# Patient Record
Sex: FEMALE | Race: WHITE | NOT HISPANIC OR LATINO | Employment: FULL TIME | URBAN - METROPOLITAN AREA
[De-identification: names, ages, dates, MRNs, and addresses within clinical notes are randomized per-mention and may not be internally consistent; named-entity substitution may affect disease eponyms.]

---

## 2022-06-02 ENCOUNTER — TRANSFERRED RECORDS (OUTPATIENT)
Dept: MULTI SPECIALTY CLINIC | Facility: CLINIC | Age: 32
End: 2022-06-02

## 2022-06-02 LAB
HPV ABSTRACT: NORMAL
PAP-ABSTRACT: NORMAL

## 2023-10-22 ENCOUNTER — HEALTH MAINTENANCE LETTER (OUTPATIENT)
Age: 33
End: 2023-10-22

## 2023-10-26 ENCOUNTER — OFFICE VISIT (OUTPATIENT)
Dept: FAMILY MEDICINE | Facility: CLINIC | Age: 33
End: 2023-10-26
Payer: COMMERCIAL

## 2023-10-26 VITALS
HEIGHT: 66 IN | BODY MASS INDEX: 27.98 KG/M2 | SYSTOLIC BLOOD PRESSURE: 97 MMHG | RESPIRATION RATE: 16 BRPM | OXYGEN SATURATION: 97 % | HEART RATE: 60 BPM | WEIGHT: 174.13 LBS | DIASTOLIC BLOOD PRESSURE: 64 MMHG | TEMPERATURE: 98.5 F

## 2023-10-26 DIAGNOSIS — F41.1 GAD (GENERALIZED ANXIETY DISORDER): ICD-10-CM

## 2023-10-26 DIAGNOSIS — K58.9 IRRITABLE BOWEL SYNDROME, UNSPECIFIED TYPE: ICD-10-CM

## 2023-10-26 DIAGNOSIS — Z11.4 SCREENING FOR HIV (HUMAN IMMUNODEFICIENCY VIRUS): ICD-10-CM

## 2023-10-26 DIAGNOSIS — Z00.00 ROUTINE GENERAL MEDICAL EXAMINATION AT A HEALTH CARE FACILITY: Primary | ICD-10-CM

## 2023-10-26 DIAGNOSIS — Z13.21 ENCOUNTER FOR VITAMIN DEFICIENCY SCREENING: ICD-10-CM

## 2023-10-26 DIAGNOSIS — F33.1 MODERATE EPISODE OF RECURRENT MAJOR DEPRESSIVE DISORDER (H): ICD-10-CM

## 2023-10-26 DIAGNOSIS — R23.3 EASY BRUISING: ICD-10-CM

## 2023-10-26 DIAGNOSIS — R79.89 ABNORMAL TSH: ICD-10-CM

## 2023-10-26 DIAGNOSIS — Z13.228 SCREENING FOR METABOLIC DISORDER: ICD-10-CM

## 2023-10-26 DIAGNOSIS — Z11.59 NEED FOR HEPATITIS C SCREENING TEST: ICD-10-CM

## 2023-10-26 DIAGNOSIS — Z13.220 SCREENING FOR LIPID DISORDERS: ICD-10-CM

## 2023-10-26 PROBLEM — J30.2 SEASONAL ALLERGIES: Status: ACTIVE | Noted: 2023-10-26

## 2023-10-26 PROBLEM — F32.9 MAJOR DEPRESSION: Status: ACTIVE | Noted: 2023-10-26

## 2023-10-26 LAB
ERYTHROCYTE [DISTWIDTH] IN BLOOD BY AUTOMATED COUNT: 12.1 % (ref 10–15)
HCT VFR BLD AUTO: 40.4 % (ref 35–47)
HGB BLD-MCNC: 13.7 G/DL (ref 11.7–15.7)
HOLD SPECIMEN: NORMAL
MCH RBC QN AUTO: 31.5 PG (ref 26.5–33)
MCHC RBC AUTO-ENTMCNC: 33.9 G/DL (ref 31.5–36.5)
MCV RBC AUTO: 93 FL (ref 78–100)
PLATELET # BLD AUTO: 210 10E3/UL (ref 150–450)
RBC # BLD AUTO: 4.35 10E6/UL (ref 3.8–5.2)
WBC # BLD AUTO: 5.4 10E3/UL (ref 4–11)

## 2023-10-26 PROCEDURE — 99214 OFFICE O/P EST MOD 30 MIN: CPT | Mod: 25

## 2023-10-26 PROCEDURE — 36415 COLL VENOUS BLD VENIPUNCTURE: CPT

## 2023-10-26 PROCEDURE — 82306 VITAMIN D 25 HYDROXY: CPT

## 2023-10-26 PROCEDURE — 85027 COMPLETE CBC AUTOMATED: CPT

## 2023-10-26 PROCEDURE — 84443 ASSAY THYROID STIM HORMONE: CPT

## 2023-10-26 PROCEDURE — 86803 HEPATITIS C AB TEST: CPT

## 2023-10-26 PROCEDURE — 99385 PREV VISIT NEW AGE 18-39: CPT

## 2023-10-26 PROCEDURE — 80061 LIPID PANEL: CPT

## 2023-10-26 PROCEDURE — 80053 COMPREHEN METABOLIC PANEL: CPT

## 2023-10-26 PROCEDURE — 87389 HIV-1 AG W/HIV-1&-2 AB AG IA: CPT

## 2023-10-26 RX ORDER — BUSPIRONE HYDROCHLORIDE 10 MG/1
10 TABLET ORAL 2 TIMES DAILY
Qty: 180 TABLET | Refills: 1 | Status: SHIPPED | OUTPATIENT
Start: 2023-10-26 | End: 2024-01-10 | Stop reason: SINTOL

## 2023-10-26 RX ORDER — BUSPIRONE HYDROCHLORIDE 10 MG/1
10 TABLET ORAL 2 TIMES DAILY
COMMUNITY
Start: 2023-05-11 | End: 2023-10-26

## 2023-10-26 RX ORDER — BUPROPION HYDROCHLORIDE 150 MG/1
150 TABLET, EXTENDED RELEASE ORAL 2 TIMES DAILY
Qty: 180 TABLET | Refills: 1 | Status: SHIPPED | OUTPATIENT
Start: 2023-10-26 | End: 2024-07-29

## 2023-10-26 ASSESSMENT — ENCOUNTER SYMPTOMS
JOINT SWELLING: 0
HEARTBURN: 0
CHILLS: 0
HEMATOCHEZIA: 0
DYSURIA: 0
HEMATURIA: 0
SHORTNESS OF BREATH: 1
NERVOUS/ANXIOUS: 1
WEAKNESS: 0
CONSTIPATION: 0
COUGH: 0
HEADACHES: 0
ARTHRALGIAS: 0
NAUSEA: 0
FREQUENCY: 0
PALPITATIONS: 0
MYALGIAS: 0
DIZZINESS: 0
SORE THROAT: 0
FEVER: 0
EYE PAIN: 0
ABDOMINAL PAIN: 0
DIARRHEA: 0
PARESTHESIAS: 0

## 2023-10-26 ASSESSMENT — ANXIETY QUESTIONNAIRES
GAD7 TOTAL SCORE: 19
1. FEELING NERVOUS, ANXIOUS, OR ON EDGE: NEARLY EVERY DAY
3. WORRYING TOO MUCH ABOUT DIFFERENT THINGS: NEARLY EVERY DAY
IF YOU CHECKED OFF ANY PROBLEMS ON THIS QUESTIONNAIRE, HOW DIFFICULT HAVE THESE PROBLEMS MADE IT FOR YOU TO DO YOUR WORK, TAKE CARE OF THINGS AT HOME, OR GET ALONG WITH OTHER PEOPLE: VERY DIFFICULT
4. TROUBLE RELAXING: MORE THAN HALF THE DAYS
GAD7 TOTAL SCORE: 19
2. NOT BEING ABLE TO STOP OR CONTROL WORRYING: NEARLY EVERY DAY
7. FEELING AFRAID AS IF SOMETHING AWFUL MIGHT HAPPEN: NEARLY EVERY DAY
6. BECOMING EASILY ANNOYED OR IRRITABLE: NEARLY EVERY DAY
5. BEING SO RESTLESS THAT IT IS HARD TO SIT STILL: MORE THAN HALF THE DAYS

## 2023-10-26 ASSESSMENT — PATIENT HEALTH QUESTIONNAIRE - PHQ9
SUM OF ALL RESPONSES TO PHQ QUESTIONS 1-9: 15
10. IF YOU CHECKED OFF ANY PROBLEMS, HOW DIFFICULT HAVE THESE PROBLEMS MADE IT FOR YOU TO DO YOUR WORK, TAKE CARE OF THINGS AT HOME, OR GET ALONG WITH OTHER PEOPLE: VERY DIFFICULT
SUM OF ALL RESPONSES TO PHQ QUESTIONS 1-9: 15

## 2023-10-26 NOTE — ASSESSMENT & PLAN NOTE
Last TSH was within normal range last year but will update labs today. No current medications or treatment.

## 2023-10-26 NOTE — PROGRESS NOTES
"   SUBJECTIVE:   CC: Reva is an 33 year old who presents for preventive health visit.       10/26/2023    12:17 PM   Additional Questions   Roomed by sac   Accompanied by self         10/26/2023    12:17 PM   Patient Reported Additional Medications   Patient reports taking the following new medications no     Generalized Anxiety:  -Long history of mental health concerns  -She has tried a number of medications in the past without success. She notes that in early stages of her treatment, she was not super consistent with taking them.  -\"Feels like I am in physical pain from it.\"  -Has been engaging in some self care techniques that she got from psychology.  -Most recent treatment with buspirone.  Not sure if this was entirely successful. Had some mild side effects that were there, but tolerable  -She reports that she was diagnosed as bipolar as a young child (13-dolores) but has not been evaluated by psychiatry as an adult  -Also wonders about OCD. Has some intrusive thoughts/tendencies    Healthy Habits:     Getting at least 3 servings of Calcium per day:  NO    Bi-annual eye exam:  NO    Dental care twice a year:  Yes    Sleep apnea or symptoms of sleep apnea:  None    Diet:  Regular (no restrictions)    Frequency of exercise:  None    Taking medications regularly:  Yes    Medication side effects:  Not applicable    Additional concerns today:  Yes    Today's PHQ-9 Score:       10/26/2023    12:02 PM   PHQ-9 SCORE   PHQ-9 Total Score MyChart 15 (Moderately severe depression)   PHQ-9 Total Score 15     Social History     Tobacco Use    Smoking status: Former     Types: Cigarettes     Passive exposure: Past    Smokeless tobacco: Never   Substance Use Topics    Alcohol use: Not on file             10/26/2023    12:01 PM   Alcohol Use   Prescreen: >3 drinks/day or >7 drinks/week? Not Applicable     Reviewed orders with patient.  Reviewed health maintenance and updated orders accordingly - Yes  Lab work is in " process  Labs reviewed in EPIC  BP Readings from Last 3 Encounters:   10/26/23 97/64    Wt Readings from Last 3 Encounters:   10/26/23 79 kg (174 lb 2 oz)                  Patient Active Problem List   Diagnosis    Abnormal TSH    PÉREZ (generalized anxiety disorder)    Moderate episode of recurrent major depressive disorder (H)    Seasonal allergies    Irritable bowel syndrome, unspecified type    Routine general medical examination at a health care facility     History reviewed. No pertinent surgical history.    Social History     Tobacco Use    Smoking status: Former     Types: Cigarettes     Passive exposure: Past    Smokeless tobacco: Never   Substance Use Topics    Alcohol use: Not on file     Family History   Problem Relation Age of Onset    Bipolar Disorder Mother     Depression Father     Breast Cancer No family hx of     Colon Cancer No family hx of     Diabetes No family hx of     Thyroid Disease No family hx of     Coronary Artery Disease Early Onset No family hx of          Current Outpatient Medications   Medication Sig Dispense Refill    buPROPion (WELLBUTRIN SR) 150 MG 12 hr tablet Take 1 tablet (150 mg) by mouth 2 times daily 180 tablet 1    busPIRone (BUSPAR) 10 MG tablet Take 1 tablet (10 mg) by mouth 2 times daily Was only taking once daily 180 tablet 1    cannabidiol (EPIDIOLEX) 100 MG/ML oral solution Take 250 mg by mouth 2 times daily CBD GUMMIES for last 2 weeks due to unable to get Buspirone       No Known Allergies    Breast Cancer Screening:        10/26/2023    12:03 PM   Breast CA Risk Assessment (FHS-7)   Do you have a family history of breast, colon, or ovarian cancer? No / Unknown     Patient under 40 years of age: Routine Mammogram Screening not recommended.   Pertinent mammograms are reviewed under the imaging tab.    History of abnormal Pap smear: NO - age 30-65 PAP every 5 years with negative HPV co-testing recommended     Reviewed and updated as needed this visit by clinical  "staff   Tobacco  Allergies  Meds  Problems  Med Hx  Surg Hx  Fam Hx          Reviewed and updated as needed this visit by Provider   Tobacco  Allergies  Meds  Problems  Med Hx  Surg Hx  Fam Hx         Review of Systems   Constitutional:  Negative for chills and fever.   HENT:  Negative for congestion, ear pain, hearing loss and sore throat.    Eyes:  Negative for pain and visual disturbance.   Respiratory:  Positive for shortness of breath. Negative for cough.    Cardiovascular:  Negative for chest pain, palpitations and peripheral edema.   Gastrointestinal:  Negative for abdominal pain, constipation, diarrhea, heartburn, hematochezia and nausea.   Genitourinary:  Negative for dysuria, frequency, genital sores, hematuria and urgency.   Musculoskeletal:  Negative for arthralgias, joint swelling and myalgias.   Skin:  Negative for rash.   Neurological:  Negative for dizziness, weakness, headaches and paresthesias.   Psychiatric/Behavioral:  Negative for mood changes. The patient is nervous/anxious.       OBJECTIVE:   BP 97/64 (BP Location: Left arm, Patient Position: Sitting, Cuff Size: Adult Large)   Pulse 60   Temp 98.5  F (36.9  C) (Oral)   Resp 16   Ht 1.664 m (5' 5.5\")   Wt 79 kg (174 lb 2 oz)   LMP 10/11/2023 (Approximate)   SpO2 97%   BMI 28.54 kg/m    Physical Exam  GENERAL: healthy, alert and no distress  EYES: Eyes grossly normal to inspection, PERRL and conjunctivae and sclerae normal  HENT: ear canals and TM's normal, nose and mouth without ulcers or lesions  NECK: no adenopathy, no asymmetry, masses, or scars and thyroid normal to palpation  RESP: lungs clear to auscultation - no rales, rhonchi or wheezes  BREAST: declined by patient. Discussed breast awareness.  CV: regular rate and rhythm, normal S1 S2, no S3 or S4, no murmur, click or rub, no peripheral edema and peripheral pulses strong  ABDOMEN: soft, nontender, no hepatosplenomegaly, no masses and bowel sounds normal  MS: no " gross musculoskeletal defects noted, no edema  SKIN: no suspicious lesions or rashes  NEURO: Normal strength and tone, mentation intact and speech normal  PSYCH: mentation appears normal. Appears anxious. Minimal eye contact.    ASSESSMENT/PLAN:     Problem List Items Addressed This Visit       Abnormal TSH     Last TSH was within normal range last year but will update labs today. No current medications or treatment.         Relevant Orders    TSH with free T4 reflex    PÉREZ (generalized anxiety disorder)    Relevant Medications    cannabidiol (EPIDIOLEX) 100 MG/ML oral solution    buPROPion (WELLBUTRIN SR) 150 MG 12 hr tablet    busPIRone (BUSPAR) 10 MG tablet    Other Relevant Orders    Adult Mental Health  Referral    Adult Mental Health  Referral    Moderate episode of recurrent major depressive disorder (H)     Long standing history of mental health concerns. Both per patient and via chart review, it is noted that she has failed many traditional medications. Additionally, patient reports a history of bipolar diagnosed as an adolescent, so agree that avoiding selective serotonin reuptake inhibitor/SNRI therapy seems most appropriate. Concurrent with anxiety; patient also notes some OCD tendencies. Based on her history, I do think she would be best served by consultation with psychiatry for medication stabilization and likely diagnosis clarification. Will plan to resume Buspar, as it was 'better than nothing' per patient. Will also add on wellbutrin, as I can't see she has been on this previously and does endorse significant depressive feelings. She denies any thoughts of self harm and/or suicide today and reports this will 'never be an option' for her as she has an 11 year old daughter. Discussed safety plan and potential side effects of these medications. Will plan for close follow up, ideally with psychiatry, but with myself in 2-3 months otherwise. We briefly discussed other supportive cares  in the form of talk therapy, diet and exercise. Can address more comprehensively in the future.         Relevant Medications    buPROPion (WELLBUTRIN SR) 150 MG 12 hr tablet    busPIRone (BUSPAR) 10 MG tablet    Other Relevant Orders    Adult Mental Health  Referral    Vitamin D Deficiency    Irritable bowel syndrome, unspecified type     Heightened symptoms with mental health struggles and poor diet. Addressing mental health as documented elsewhere. Provided handout related to dietary alterations. Will refer to gastroenterology if symptoms persist despite this.         Relevant Orders    Adult GI  Referral - Consult Only    Routine general medical examination at a health care facility - Primary     Annual exam. New patient.  Patient is at average risk of breast and colon cancer, therefore screenings have not been initiated.  PAP: UTD  Immunizations: Received COVID and influenza through work. Otherwise, UTD.  Labs: Discussed and offered today. Patient would like wide ranging workup.  Mood: As documented elsewhere.  BMI: 28.54. Discussed diet and exercise  Discussed bone health. Checking Vitamin D.  Contraceptive form: None. I highly encouraged patient to stabilize mental health prior to achieving pregnancy.  Follow up in one year for annual exam or sooner if needed/indicated.          Other Visit Diagnoses       Screening for HIV (human immunodeficiency virus)        Relevant Orders    HIV Antigen Antibody Combo    Need for hepatitis C screening test        Relevant Orders    Hepatitis C Screen Reflex to HCV RNA Quant and Genotype    Easy bruising        Relevant Orders    CBC with Platelets and Reflex to Iron Studies    Screening for metabolic disorder        Relevant Orders    Comprehensive metabolic panel (BMP + Alb, Alk Phos, ALT, AST, Total. Bili, TP)    Screening for lipid disorders        Relevant Orders    Lipid panel reflex to direct LDL Fasting    Encounter for vitamin deficiency  "screening        Relevant Orders    Vitamin D Deficiency          Depression Screening Follow Up        10/26/2023    12:02 PM   PHQ   PHQ-9 Total Score 15   Q9: Thoughts of better off dead/self-harm past 2 weeks Not at all         10/26/2023    12:02 PM   Last PHQ-9   1.  Little interest or pleasure in doing things 3   2.  Feeling down, depressed, or hopeless 3   3.  Trouble falling or staying asleep, or sleeping too much 2   4.  Feeling tired or having little energy 1   5.  Poor appetite or overeating 1   6.  Feeling bad about yourself 3   7.  Trouble concentrating 2   8.  Moving slowly or restless 0   Q9: Thoughts of better off dead/self-harm past 2 weeks 0   PHQ-9 Total Score 15       Follow Up Actions Taken  Crisis resource information provided in After Visit Summary  Depression Action Plan reviewed with patient.  Mental Health Referral placed  Started patient on anti-depressant.  Adjusted patient's anti-depressant medication.       COUNSELING:  Reviewed preventive health counseling, as reflected in patient instructions       Regular exercise       Healthy diet/nutrition       Contraception       Family planning       Osteoporosis prevention/bone health       Safe sex practices/STD prevention      BMI:   Estimated body mass index is 28.54 kg/m  as calculated from the following:    Height as of this encounter: 1.664 m (5' 5.5\").    Weight as of this encounter: 79 kg (174 lb 2 oz).     Weight management plan: Discussed healthy diet and exercise guidelines    She reports that she has quit smoking. Her smoking use included cigarettes. She has been exposed to tobacco smoke. She has never used smokeless tobacco.          WILFRED Mancera CNP  [facility]    Answers submitted by the patient for this visit:  Patient Health Questionnaire (Submitted on 10/26/2023)  If you checked off any problems, how difficult have these problems made it for you to do your work, take care of things at home, " or get along with other people?: Very difficult  PHQ9 TOTAL SCORE: 15

## 2023-10-26 NOTE — ASSESSMENT & PLAN NOTE
Annual exam. New patient.  Patient is at average risk of breast and colon cancer, therefore screenings have not been initiated.  PAP: UTD  Immunizations: Received COVID and influenza through work. Otherwise, UTD.  Labs: Discussed and offered today. Patient would like wide ranging workup.  Mood: As documented elsewhere.  BMI: 28.54. Discussed diet and exercise  Discussed bone health. Checking Vitamin D.  Contraceptive form: None. I highly encouraged patient to stabilize mental health prior to achieving pregnancy.  Follow up in one year for annual exam or sooner if needed/indicated.

## 2023-10-26 NOTE — ASSESSMENT & PLAN NOTE
Long standing history of mental health concerns. Both per patient and via chart review, it is noted that she has failed many traditional medications. Additionally, patient reports a history of bipolar diagnosed as an adolescent, so agree that avoiding selective serotonin reuptake inhibitor/SNRI therapy seems most appropriate. Concurrent with anxiety; patient also notes some OCD tendencies. Based on her history, I do think she would be best served by consultation with psychiatry for medication stabilization and likely diagnosis clarification. Will plan to resume Buspar, as it was 'better than nothing' per patient. Will also add on wellbutrin, as I can't see she has been on this previously and does endorse significant depressive feelings. She denies any thoughts of self harm and/or suicide today and reports this will 'never be an option' for her as she has an 11 year old daughter. Discussed safety plan and potential side effects of these medications. Will plan for close follow up, ideally with psychiatry, but with myself in 2-3 months otherwise. We briefly discussed other supportive cares in the form of talk therapy, diet and exercise. Can address more comprehensively in the future.

## 2023-10-26 NOTE — PATIENT INSTRUCTIONS
For Mental Health:  -Restart your buspar. Start with once a day; if tolerating well, increase to twice a day.  -Do this for at least 2 weeks  -After two weeks, start with one Wellbutrin the morning (150mg). After a minimum of three days, you can add on a second wellbutrin at night (also 150mg).  -I have placed referrals for both psychiatry and counseling    For irritable bowel:  -We will work on mental health as we talked about  -Look at the diet handout  -I did place an order for a gastroenterologist specialist as well    Preventive Health Recommendations  Female Ages 26 - 39  Yearly exam:   See your health care provider every year in order to  Review health changes.   Discuss preventive care.    Review your medicines if you your doctor has prescribed any.    Until age 30: Get a Pap test every three years (more often if you have had an abnormal result).    After age 30: Talk to your doctor about whether you should have a Pap test every 3 years or have a Pap test with HPV screening every 5 years.   You do not need a Pap test if your uterus was removed (hysterectomy) and you have not had cancer.  You should be tested each year for STDs (sexually transmitted diseases), if you're at risk.   Talk to your provider about how often to have your cholesterol checked.  If you are at risk for diabetes, you should have a diabetes test (fasting glucose).  Shots: Get a flu shot each year. Get a tetanus shot every 10 years.   Nutrition:   Eat at least 5 servings of fruits and vegetables each day.  Eat whole-grain bread, whole-wheat pasta and brown rice instead of white grains and rice.  Get adequate Calcium and Vitamin D.     Lifestyle  Exercise at least 150 minutes a week (30 minutes a day, 5 days of the week). This will help you control your weight and prevent disease.  Limit alcohol to one drink per day.  No smoking.   Wear sunscreen to prevent skin cancer.  See your dentist every six months for an exam and cleaning.

## 2023-10-26 NOTE — ASSESSMENT & PLAN NOTE
Heightened symptoms with mental health struggles and poor diet. Addressing mental health as documented elsewhere. Provided handout related to dietary alterations. Will refer to gastroenterology if symptoms persist despite this.

## 2023-10-27 LAB
ALBUMIN SERPL BCG-MCNC: 4.4 G/DL (ref 3.5–5.2)
ALP SERPL-CCNC: 49 U/L (ref 35–104)
ALT SERPL W P-5'-P-CCNC: 20 U/L (ref 0–50)
ANION GAP SERPL CALCULATED.3IONS-SCNC: 10 MMOL/L (ref 7–15)
AST SERPL W P-5'-P-CCNC: 23 U/L (ref 0–45)
BILIRUB SERPL-MCNC: 0.5 MG/DL
BUN SERPL-MCNC: 7.9 MG/DL (ref 6–20)
CALCIUM SERPL-MCNC: 9.1 MG/DL (ref 8.6–10)
CHLORIDE SERPL-SCNC: 104 MMOL/L (ref 98–107)
CHOLEST SERPL-MCNC: 148 MG/DL
CREAT SERPL-MCNC: 0.82 MG/DL (ref 0.51–0.95)
DEPRECATED HCO3 PLAS-SCNC: 27 MMOL/L (ref 22–29)
EGFRCR SERPLBLD CKD-EPI 2021: >90 ML/MIN/1.73M2
GLUCOSE SERPL-MCNC: 82 MG/DL (ref 70–99)
HCV AB SERPL QL IA: NONREACTIVE
HDLC SERPL-MCNC: 58 MG/DL
HIV 1+2 AB+HIV1 P24 AG SERPL QL IA: NONREACTIVE
LDLC SERPL CALC-MCNC: 70 MG/DL
NONHDLC SERPL-MCNC: 90 MG/DL
POTASSIUM SERPL-SCNC: 3.7 MMOL/L (ref 3.4–5.3)
PROT SERPL-MCNC: 7 G/DL (ref 6.4–8.3)
SODIUM SERPL-SCNC: 141 MMOL/L (ref 135–145)
TRIGL SERPL-MCNC: 99 MG/DL
TSH SERPL DL<=0.005 MIU/L-ACNC: 3.27 UIU/ML (ref 0.3–4.2)
VIT D+METAB SERPL-MCNC: 15 NG/ML (ref 20–50)

## 2023-10-27 NOTE — TELEPHONE ENCOUNTER
REFERRAL INFORMATION:  Referring Provider:  Angy Waldrop APRN CNP   Referring Clinic:  [facility]   Reason for Visit/Diagnosis:  Irritable bowel syndrome, unspecified type      FUTURE VISIT INFORMATION:  Appointment Date: 11/13/2023  Appointment Time:      NOTES STATUS DETAILS   OFFICE NOTE from Referring Provider Internal 10/26/2023 OV with JOHNATHAN Waldrop   OFFICE NOTE from Other Specialist N/A    HOSPITAL DISCHARGE SUMMARY/  ED VISITS N/A    OPERATIVE REPORT N/A    MEDICATION LIST Internal         ENDOSCOPY  N/A    COLONOSCOPY N/A    IMAGING (CT, MRI, EGD, MRCP, Small Bowel Follow Through/SBT, MR/CT Enterography) N/A

## 2023-10-30 DIAGNOSIS — E55.9 VITAMIN D DEFICIENCY: Primary | ICD-10-CM

## 2023-10-30 RX ORDER — VITAMIN B COMPLEX
25 TABLET ORAL DAILY
Qty: 90 TABLET | Refills: 1 | Status: SHIPPED | OUTPATIENT
Start: 2023-10-30

## 2023-11-13 ENCOUNTER — VIRTUAL VISIT (OUTPATIENT)
Dept: GASTROENTEROLOGY | Facility: CLINIC | Age: 33
End: 2023-11-13
Payer: COMMERCIAL

## 2023-11-13 ENCOUNTER — PRE VISIT (OUTPATIENT)
Dept: GASTROENTEROLOGY | Facility: CLINIC | Age: 33
End: 2023-11-13

## 2023-11-13 VITALS — HEIGHT: 66 IN | BODY MASS INDEX: 27.48 KG/M2 | WEIGHT: 171 LBS

## 2023-11-13 DIAGNOSIS — K58.9 IRRITABLE BOWEL SYNDROME, UNSPECIFIED TYPE: ICD-10-CM

## 2023-11-13 DIAGNOSIS — R19.7 DIARRHEA, UNSPECIFIED TYPE: ICD-10-CM

## 2023-11-13 DIAGNOSIS — R11.0 NAUSEA: ICD-10-CM

## 2023-11-13 DIAGNOSIS — R10.84 ABDOMINAL PAIN, GENERALIZED: Primary | ICD-10-CM

## 2023-11-13 PROCEDURE — 99203 OFFICE O/P NEW LOW 30 MIN: CPT | Mod: 95 | Performed by: PHYSICIAN ASSISTANT

## 2023-11-13 ASSESSMENT — PAIN SCALES - GENERAL: PAINLEVEL: MILD PAIN (2)

## 2023-11-13 NOTE — PROGRESS NOTES
"    GI NEW PATIENT VISIT     CC/REFERRING MD:    WILFRED Mancera CNP     REASON FOR CONSULTATION:   Referred by Angy Waldrop for Consult        HISTORY OF PRESENT ILLNESS:    Reva Spangler is 33 year old female with hx of IBS, anxiety/depression who presents for GI consult.     She has a long history of GI troubles, stating that she has been \"living with it for all of my life.\" It is to the point now where it affects her sleep. She feels nauseous frequently for the past several months. She wakes up feeling nauseous and it continues throughout the day. Once in a while she is vomiting. Denies reflux or heartburn. No dysphagia. She has a lower abdominal pain that occurs after eating. Doesn't seem to matter what she eats. She has diarrhea frequently. Can have 8BM's in the morning alone. Even wakes up in the middle of the night with diarrhea and intestinal pain. No hematochezia or melena.  No rectal pain. Rarely has constipation. No weight loss. Weight has remained stable. Takes ibuprofen as needed. About once every few weeks.     Denies smoking. Alcohol once every few months. Previously taking CBD gummies for anxiety, stopped now that she is on medical mgmt for anxiety.     No abdominal surgeries. No pertinent family hx.     She  reports that she has quit smoking. Her smoking use included cigarettes. She has been exposed to tobacco smoke. She has never used smokeless tobacco.    Her family history includes Bipolar Disorder in her mother; Depression in her father.    ALLERGIES:  Patient has no known allergies.      PERTINENT MEDICATIONS:    Current Outpatient Medications:     buPROPion (WELLBUTRIN SR) 150 MG 12 hr tablet, Take 1 tablet (150 mg) by mouth 2 times daily, Disp: 180 tablet, Rfl: 1    busPIRone (BUSPAR) 10 MG tablet, Take 1 tablet (10 mg) by mouth 2 times daily Was only taking once daily, Disp: 180 tablet, Rfl: 1    cannabidiol (EPIDIOLEX) 100 MG/ML oral solution, Take 250 mg by mouth 2 times " daily CBD GUMMIES for last 2 weeks due to unable to get Buspirone, Disp: , Rfl:     Vitamin D3 (CHOLECALCIFEROL) 25 mcg (1000 units) tablet, Take 1 tablet (25 mcg) by mouth daily, Disp: 90 tablet, Rfl: 1        PHYSICAL EXAMINATION:  Constitutional: aaox3, cooperative, pleasant, not dyspneic/diaphoretic, no acute distress      GENERAL: Healthy, alert and no distress  EYES: Eyes grossly normal to inspection.  No discharge or erythema, or obvious scleral/conjunctival abnormalities.  RESP: No audible wheeze, cough, or visible cyanosis.  No visible retractions or increased work of breathing.    SKIN: Visible skin clear. No significant rash, abnormal pigmentation or lesions.  NEURO: Cranial nerves grossly intact.  Mentation and speech appropriate for age.  PSYCH: Mentation appears normal, affect normal/bright, judgement and insight intact, normal speech and appearance well-groomed.          PERTINENT STUDIES: (I personally reviewed these laboratory studies today)  Most recent CBC:   Recent Labs   Lab Test 10/26/23  1311   WBC 5.4   HGB 13.7   HCT 40.4        Most recent hepatic panel:  Recent Labs   Lab Test 10/26/23  1311   ALT 20   AST 23     Most recent creatinine:  Recent Labs   Lab Test 10/26/23  1311   CR 0.82       TSH   Date Value Ref Range Status   10/26/2023 3.27 0.30 - 4.20 uIU/mL Final           ASSESSMENT/PLAN:    IBS  Abdominal pain   Diarrhea   Nausea         Reva Spangler is a 33 year old female with IBS and anxiety/depression who presents for GI consult.   She has a long history of digestive symptoms but reports symptoms as progressively worsening in recent years. She notes abdominal pains, diarrhea, nighttime stools, nausea. Reviewed her symptoms and potential causes. She does have hx of IBS and anxiety so my suspicion for functional GI disorder is high. Her symptoms could also overlap with organic disease processes, the nighttime stooling does raise concern for inflammatory condition. We will  therefore check labs and stool studies: cbc, cmp, crp, esr, celiac screening, fecal calpro and infectious stool studies. Consider endoscopic evaluation. Otherwise we will focus on managing IBS and functional GI disorder with the help of nutritionist and GI psychologist and symptomatic management. In the interim, recommended starting a fiber supplement to improve stool consistency.     Follow up in 2-3 months, sooner if needed.       Thank you for this consultation.  It was a pleasure to participate in the care of this patient; please contact us with any further questions.        This note was created with voice recognition software, and while reviewed for accuracy, typos may remain.       I spent a total of 33 minutes on the day of the visit.   Time spent by me doing chart review, history and exam, documentation and further activities per the note      Ruth Ramon PA-C  Division of Gastroenterology, Hepatology and Nutrition   [facility]            Video-Visit Details    Type of service:  Video Visit    Patient visit on November 13, 2023            Started at 12:35 PM            Ended at 12:51 PM            Total length of 15m59s         Visit was conducted over [employer] . Patient was informed of the benefits and limitations of telemedicine. Patient consented to real time communication over audio, video, and/or data.         Signed, Ruth Ramon PA-C      Originating Location (pt. Location): Home    Distant Location (provider location):  On-site    Platform used for Video Visit: [employer]

## 2023-11-14 NOTE — PATIENT INSTRUCTIONS
It was a pleasure taking care of you today.  I've included a brief summary of our discussion and care plan from today's visit below.  Please review this information with your primary care provider.  _______________________________________________________________________     My recommendations are summarized as follows:     - schedule a lab appointment for blood work and stool studies   -- Please start a fiber supplement. I recommend a powdered soluble fiber such as metamucil or citrucel. This can help regulate your bowel movements and promote better consistency of your stools. Start with 1-2 teaspoons per day, with goal to gradually increase to 1 tablespoon daily. You can increase up to 1 tablespoon three times daily if needed. Stay well-hydrated with use of fiber supplementation and to make sure that the fiber powder is well mixed with water as directed on the label.    - Follow up in ~ 2-3 months.    ______________________________________________________________________     How do I schedule labs, imaging studies, or procedures that were ordered in clinic today?      Labs: To schedule lab appointment you can contact your local [facility] or call [phone] to schedule at any convenient [facility] location.     Procedures: If a colonoscopy, upper endoscopy, breath test, esophageal manometry, or pH impedence was ordered today, our endoscopy team will call you to schedule this. If you have not heard from our endoscopy team within a week, please call [phone] to schedule.      Imaging Studies: If you were scheduled for a CT scan, X-ray, MRI, ultrasound, HIDA scan or other imaging study, please call [phone] to have this scheduled.      Referral: If a referral to another specialty was ordered, expect a phone call or follow instructions above. If you have not heard from anyone regarding your referral in a week, please call our clinic to check the status.      Who do I call with any questions  after my visit?  Please be in touch if there are any further questions that arise following today's visit.  There are multiple ways to contact your gastroenterology care team.       During business hours, you may reach a Gastroenterology nurse at [phone]     To schedule or reschedule an appointment, please call [phone].      You can always send a secure message through [employer].  [employer] messages are answered by your nurse or doctor typically within 24 hours.  Please allow extra time on weekends and holidays.       For urgent/emergent questions after business hours, you may reach the on-call GI Fellow by contacting the [facility]  at [phone].     How will I get the results of any tests ordered?    You will receive all of your results.  If you have signed up for [employer], any tests ordered at your visit will be available to you after your physician reviews them.  Typically this takes 1-2 weeks.  If there are urgent results that require a change in your care plan, your physician or nurse will call you to discuss the next steps.       What is Be [employer]?  [employer] is a secure way for you to access all of your healthcare records from the [facility].  It is a web based computer program, so you can sign on to it from any location.  It also allows you to send secure messages to your care team.  I recommend signing up for [employer] access if you have not already done so and are comfortable with using a computer.       How to I schedule a follow-up visit?  If you did not schedule a follow-up visit today, please call [phone] to schedule a follow-up office visit.      Ruth Ramon PA-C  Division of Gastroenterology, Hepatology and Nutrition   [facility]

## 2023-12-29 ENCOUNTER — VIRTUAL VISIT (OUTPATIENT)
Dept: FAMILY MEDICINE | Facility: CLINIC | Age: 33
End: 2023-12-29
Payer: COMMERCIAL

## 2023-12-29 DIAGNOSIS — F33.1 MODERATE EPISODE OF RECURRENT MAJOR DEPRESSIVE DISORDER (H): Primary | ICD-10-CM

## 2023-12-29 PROCEDURE — 99213 OFFICE O/P EST LOW 20 MIN: CPT | Mod: VID

## 2023-12-29 ASSESSMENT — PATIENT HEALTH QUESTIONNAIRE - PHQ9: SUM OF ALL RESPONSES TO PHQ QUESTIONS 1-9: 14

## 2023-12-29 NOTE — PROGRESS NOTES
Reva is a 33 year old who is being evaluated via a billable video visit.      How would you like to obtain your AVS? MyChart  If the video visit is dropped, the invitation should be resent by: Send to e-mail at: [email or web address]  Will anyone else be joining your video visit? No      Assessment & Plan   Problem List Items Addressed This Visit       Moderate episode of recurrent major depressive disorder (H) - Primary     Follow-up today on medication changes.  Patient was initiated on Wellbutrin approximately 2 months ago for concerns of depression.  She has a historically challenging mental health history, with multiple medications either noted to be inefficacious or with side effects.  Today, she reports that she is quite happy with the initiation of Wellbutrin.  She had some side effects initially, but these have since resolved.  She has an appointment scheduled with psychiatry for 1/10 and we reviewed this today.  We discussed that she is not on a maximum dose of Wellbutrin, so a reasonable next step would be to increase this dose if she found success with it, although likely cautiously due to her side effects with initiation.  However, due to her upcoming psychiatry appointment, we will ultimately defer any adjustments at this time.  Patient is comfortable with this.  She denies any safety concerns or thoughts of self-harm.  I encouraged her to reach out with any additional needs in the interim, but will plan to defer to psychiatry until medications are stabilized and then plan to resume prescribing.  Patient expressed understanding of and agreement with this.  All questions were answered.           WILFRED [facility]   Reva is a 33 year old, presenting for the following health issues:  Recheck Medication        12/29/2023     2:11 PM   Additional Questions   Roomed by isha       Follow up on mental health medication  Was started on Wellbutrin  "approximately two months ago. She notes that his has 'helped me a lot.' She reports that she does not 'feel like a zombie' anymore.  She initially noted nausea for the first few weeks while taking the medication, but these have since resolved.  Yony is \"OK\" and she reports a very similar experience to when she was on it previously.   Notes some family/home issues that have 'set her back' as of recently.   Is looking forward to getting back into therapy  Has psychiatry appointment scheduled for 1/10    History of Present Illness       Reason for visit:  Recheck medication    She eats 0-1 servings of fruits and vegetables daily.She consumes 1 sweetened beverage(s) daily.She exercises with enough effort to increase her heart rate 9 or less minutes per day.  She exercises with enough effort to increase her heart rate 3 or less days per week. She is missing 1 dose(s) of medications per week.  She is not taking prescribed medications regularly due to remembering to take.     Review of Systems         Objective       Vitals:  No vitals were obtained today due to virtual visit.    Physical Exam   GENERAL: Healthy, alert and no distress  EYES: Eyes grossly normal to inspection.  No discharge or erythema, or obvious scleral/conjunctival abnormalities.  RESP: No audible wheeze, cough, or visible cyanosis.  No visible retractions or increased work of breathing.    SKIN: Visible skin clear. No significant rash, abnormal pigmentation or lesions.  NEURO: Cranial nerves grossly intact.  Mentation and speech appropriate for age.  PSYCH: Mentation appears normal, affect normal/bright, judgement and insight intact, normal speech and appearance well-groomed.          Video-Visit Details    Type of service:  Video Visit     Originating Location (pt. Location): Home    Distant Location (provider location):  On-site  Platform used for Video Visit: Jeferson      "

## 2023-12-29 NOTE — ASSESSMENT & PLAN NOTE
Follow-up today on medication changes.  Patient was initiated on Wellbutrin approximately 2 months ago for concerns of depression.  She has a historically challenging mental health history, with multiple medications either noted to be inefficacious or with side effects.  Today, she reports that she is quite happy with the initiation of Wellbutrin.  She had some side effects initially, but these have since resolved.  She has an appointment scheduled with psychiatry for 1/10 and we reviewed this today.  We discussed that she is not on a maximum dose of Wellbutrin, so a reasonable next step would be to increase this dose if she found success with it, although likely cautiously due to her side effects with initiation.  However, due to her upcoming psychiatry appointment, we will ultimately defer any adjustments at this time.  Patient is comfortable with this.  She denies any safety concerns or thoughts of self-harm.  I encouraged her to reach out with any additional needs in the interim, but will plan to defer to psychiatry until medications are stabilized and then plan to resume prescribing.  Patient expressed understanding of and agreement with this.  All questions were answered.

## 2024-01-10 ENCOUNTER — OFFICE VISIT (OUTPATIENT)
Dept: PSYCHIATRY | Facility: CLINIC | Age: 34
End: 2024-01-10
Payer: COMMERCIAL

## 2024-01-10 ENCOUNTER — OFFICE VISIT (OUTPATIENT)
Dept: BEHAVIORAL HEALTH | Facility: CLINIC | Age: 34
End: 2024-01-10
Payer: COMMERCIAL

## 2024-01-10 VITALS
SYSTOLIC BLOOD PRESSURE: 116 MMHG | DIASTOLIC BLOOD PRESSURE: 78 MMHG | HEIGHT: 66 IN | WEIGHT: 160 LBS | OXYGEN SATURATION: 99 % | BODY MASS INDEX: 25.71 KG/M2 | HEART RATE: 77 BPM

## 2024-01-10 DIAGNOSIS — F33.1 MODERATE EPISODE OF RECURRENT MAJOR DEPRESSIVE DISORDER (H): Primary | ICD-10-CM

## 2024-01-10 DIAGNOSIS — F41.1 GAD (GENERALIZED ANXIETY DISORDER): Primary | ICD-10-CM

## 2024-01-10 DIAGNOSIS — F43.9 TRAUMA AND STRESSOR-RELATED DISORDER: ICD-10-CM

## 2024-01-10 DIAGNOSIS — F42.2 MIXED OBSESSIONAL THOUGHTS AND ACTS: ICD-10-CM

## 2024-01-10 DIAGNOSIS — F41.1 GAD (GENERALIZED ANXIETY DISORDER): ICD-10-CM

## 2024-01-10 PROCEDURE — 90791 PSYCH DIAGNOSTIC EVALUATION: CPT | Performed by: COUNSELOR

## 2024-01-10 PROCEDURE — 99204 OFFICE O/P NEW MOD 45 MIN: CPT | Performed by: PSYCHIATRY & NEUROLOGY

## 2024-01-10 RX ORDER — FLUVOXAMINE MALEATE 50 MG
TABLET ORAL
Qty: 30 TABLET | Refills: 1 | Status: SHIPPED | OUTPATIENT
Start: 2024-01-10 | End: 2024-05-16

## 2024-01-10 ASSESSMENT — COLUMBIA-SUICIDE SEVERITY RATING SCALE - C-SSRS
ATTEMPT LIFETIME: NO
1. HAVE YOU WISHED YOU WERE DEAD OR WISHED YOU COULD GO TO SLEEP AND NOT WAKE UP?: YES
TOTAL  NUMBER OF INTERRUPTED ATTEMPTS LIFETIME: NO
6. HAVE YOU EVER DONE ANYTHING, STARTED TO DO ANYTHING, OR PREPARED TO DO ANYTHING TO END YOUR LIFE?: NO
2. HAVE YOU ACTUALLY HAD ANY THOUGHTS OF KILLING YOURSELF?: NO
REASONS FOR IDEATION LIFETIME: DOES NOT APPLY
1. IN THE PAST MONTH, HAVE YOU WISHED YOU WERE DEAD OR WISHED YOU COULD GO TO SLEEP AND NOT WAKE UP?: NO
TOTAL  NUMBER OF ABORTED OR SELF INTERRUPTED ATTEMPTS LIFETIME: NO

## 2024-01-10 ASSESSMENT — ANXIETY QUESTIONNAIRES
IF YOU CHECKED OFF ANY PROBLEMS ON THIS QUESTIONNAIRE, HOW DIFFICULT HAVE THESE PROBLEMS MADE IT FOR YOU TO DO YOUR WORK, TAKE CARE OF THINGS AT HOME, OR GET ALONG WITH OTHER PEOPLE: EXTREMELY DIFFICULT
7. FEELING AFRAID AS IF SOMETHING AWFUL MIGHT HAPPEN: NEARLY EVERY DAY
2. NOT BEING ABLE TO STOP OR CONTROL WORRYING: NEARLY EVERY DAY
5. BEING SO RESTLESS THAT IT IS HARD TO SIT STILL: MORE THAN HALF THE DAYS
GAD7 TOTAL SCORE: 20
3. WORRYING TOO MUCH ABOUT DIFFERENT THINGS: NEARLY EVERY DAY
6. BECOMING EASILY ANNOYED OR IRRITABLE: NEARLY EVERY DAY
1. FEELING NERVOUS, ANXIOUS, OR ON EDGE: NEARLY EVERY DAY
GAD7 TOTAL SCORE: 20

## 2024-01-10 ASSESSMENT — PATIENT HEALTH QUESTIONNAIRE - PHQ9
5. POOR APPETITE OR OVEREATING: NEARLY EVERY DAY
SUM OF ALL RESPONSES TO PHQ QUESTIONS 1-9: 19

## 2024-01-10 NOTE — PROGRESS NOTES
"[facility] Psychiatry Intake      IDENTIFICATION   Name: Reva Spangler   : 1990/33 year old      Sex:    @ female          Face to Face/patient Contact total time: 30 minutes  Pre Charting time: 7 minutes; Post charting time, communication and other activities: 5 minutes; Total time 42 minutes  9:29 AM - 9:59am    CHIEF COMPLAINT   Source of Referral:    Primary Care Provider:   Angy Waldrop     OCD  Hx depression with question of bipolar disorder      HISTORY OF PRESENT ILLNESS   Anxiety is most difficult issue. Struggling badly. Checking things many times like stove. Hard time making it through every day. Reports general avoidance and psychosocial impact. Faces dread and fear. Feels OCD is strongest contribute. Ego dystonic. Comes up with many what if scenarios then fears a specific outcome will happen, and hard to challenge the thoughts.     Examples include fear keeping ahirdryer on. Things she has to do make sure repeatedly are done. Many times checks the door - despite knowing they are off she still has to do it. Worries about things like breaking in - logically knows that it won't happen but at the same time having constant anxiety about it.     No motivation or desire. +anhedonia.   The following assessments were completed by patient for this visit:  PROMIS 10-Global Health (only subscores and total score):       1/10/2024     9:06 AM   PROMIS-10 Scores Only   Global Mental Health Score 4   Global Physical Health Score 11   PROMIS TOTAL - SUBSCORES 15       Vital Signs:   LMP 2023    12:14 PM   Vitals - Patient Reported   Height (Patient Reported) 5' 5.5\"   Weight (Patient Reported) 171 lb   BMI (Based on Pt Reported Ht/Wt) 28.02 kg/m2                    10/26/2023    12:02 PM 2023     2:08 PM   PHQ   PHQ-9 Total Score 15 14   Q9: Thoughts of better off dead/self-harm past 2 weeks Not at all Not at all          10/26/2023    12:00 PM   PÉREZ-7 SCORE   Total " Score 19        REVIEW OF SYSTEMS:   Time insufficient to review      PAST PSYCHIATRIC HISTORY:   No history of sustained manic or hypomanic episodes  Med Trials:  Bupropion - has been helpful for depression; initial side effects that resolved  Buspirone - feels like a zombie after taking, reports she feels awful, makes it feel like head is full of water - lasting for two hours then leveling out  Self-Directed Violence: None      PAST MEDICAL HISTORY:   No past medical history on file.   has no past medical history on file.    Current medications include:   Current Outpatient Medications   Medication Sig    buPROPion (WELLBUTRIN SR) 150 MG 12 hr tablet Take 1 tablet (150 mg) by mouth 2 times daily    busPIRone (BUSPAR) 10 MG tablet Take 1 tablet (10 mg) by mouth 2 times daily Was only taking once daily    cannabidiol (EPIDIOLEX) 100 MG/ML oral solution Take 250 mg by mouth 2 times daily CBD GUMMIES for last 2 weeks due to unable to get Buspirone (Patient not taking: Reported on 12/29/2023)    Vitamin D3 (CHOLECALCIFEROL) 25 mcg (1000 units) tablet Take 1 tablet (25 mcg) by mouth daily     No current facility-administered medications for this visit.         FAMILY HISTORY:   Substance use issues    SOCIAL HISTORY:   Had a bad childhood, hx trauma        MENTAL STATUS EXAMINATION:   Appearance: Intact attention to grooming and hygiene, casual garb  Attitude: Cooperative  Eye Contact: Intact  Gait and Station: Sitting  Psychomotor Behavior: Within normal limits  Oriented to: Grossly person place and time  Attention Span and Concentration: Grossly fair  Speech: Anxious tone  Language: English  Mood:  sad   Affect: Constricted  Associations:  no loose associations  Thought Process:  logical, linear and goal oriented  Thought Content: No evidence of delusions or suicidal or homicidal ideation plan or intent  Memory: Grossly intact  Fund of Knowledge: Intact  Insight:  good  Judgment:  intact, adequate for safety  Impulse  Control:  intact        DIAGNOSES:   Obsessive-compulsive disorder  Major depressive disorder, moderate, recurrent  Rule out generalized anxiety disorder  Trauma and stress related disorder      ASSESSMENT:   Patient with prominent difficulty with obsessions, also difficulty with depression in context of difficult upbringing and childhood trauma.  Pursue Luvox to address OCD symptoms and depression, as well as trauma related psychiatric symptoms.    Today Reva Spangler reports no suicidal ideations. In addition, she has notable risk factors for self-harm, including anxiety. However, risk is mitigated by commitment to family, absence of past attempts, and history of seeking help when needed. Therefore, based on all available evidence including the factors cited above, she does not appear to be at imminent risk for self-harm, does not meet criteria for a 72-hr hold, and therefore remains appropriate for ongoing outpatient level of care.       PLAN:     Consult completed  Does not meet criteria for involuntary treatment or hospitalization  Continue Wellbutrin  mg p.o. twice daily-Risks, benefits and alternatives discussed.  Patient provides verbal consent to treatment.  Start Luvox 1/10/24 25 mg nightly x 7 nights then 50 nightly-Risks, benefits and alternatives discussed.  Patient provides verbal consent to treatment.  Advised of interaction with bupropion.  On vitamin D 25 mcg / 1000 units daily  Dc'd buspirone (feeling like a zombie, unspecified feeling worse physically)  Received referral from [facility] for therapy  Return to PCP in 3 months      Administrative Billing:   Time spent with patient was greater than 50% of time and/or significant time was spent in counseling and coordination of care regarding above diagnoses and treatment plan. Pre charting time and post charting time/documentation/coordination are done on date of service.     Signed:   Eddie Purdy M.D.  Collaborative Care Psychiatry  Service    Disclaimer: This note consists of symbols derived from keyboarding, dictation and/or voice recognition software. As a result, there may be errors in the script that have gone undetected. Please consider this when interpreting information found in this chart.    eoc  RETURN TO REFERRING PROVIDER FINAL TREATMENT PLAN  The Psychiatric provider and/or Behavioral health clinician (BHC) have completed consultation with the patient and are returning to referring provider care for continued care. For worsening symptoms/condition recommendations include:    Medication Recommendations   -Luvox may be titrated by 25 mg increments up to 100 mg twice daily or 200 mg nightly, though technically if needed can go up to 300 total in a day  -Alternatively may cross taper to Viibryd, taper Luvox by 25 to 50 mg increments every few weeks until discontinued.  Viibryd started at 10 mg daily for 7 days then 20 mg.  Titrate 10 mg increments up to 40 mg daily    Behavioral Recommendations  Making sure to be engaged with others, exercise, socialize even with low energy attempt to do at least some things        Consider pharmacologic and nonpharmacologic recommendations, if the patient has followed through on recommendations/referrals and any other helpful pertinent information (e.g., recent stressors, med adherence, enough time on the medication or high enough dose etc.)      If post consult recommendations fail, use any of the following options   Reach out to the CCPS provider through Epic staff message for guidance   Order an Adult Behavioral Health eConsult ([id number]) or Pediatric eConsult (SNHP254)   Refer for another CCPS consultation (after 6 months) or refer to Psychiatry/Long-term management (Mental Health Referral 9003, [facility]/Med management and Long-term management)

## 2024-01-10 NOTE — PATIENT INSTRUCTIONS
"[employer]    Patient Education   Collaborative Care Psychiatry Service  What to Expect  Here's what to expect from your Collaborative Care Psychiatry Service (CCPS).   About CCPS  CCPS means 2 people work together to help you get better. You'll meet with a behavioral health clinician and a psychiatric doctor. A behavioral health clinician helps people with mental health problems by talking with them. A psychiatric doctor helps people by giving them medicine.  How it works  At every visit, you'll see the behavioral health clinician (BHC) first. They'll talk with you about how you're doing and teach you how to feel better.   Then you'll see the psychiatric doctor. This doctor can help you deal with troubling thoughts and feelings by giving you medicine. They'll make sure you know the plan for your care.   CCPS usually takes 3 to 6 visits. If you need more visits, we may have you start seeing a different psychiatric doctor for ongoing care.  If you have any questions or concerns, we'll be glad to talk with you.  About visits  Be open  At your visits, please talk openly about your problems. It may feel hard, but it's the best way for us to help you.  Cancelling visits  If you can't come to your visit, please call us right away at [phone]. If you don't cancel at least 24 hours (1 full day) before your visit, that's \"late cancellation.\"  Being late to visits  Being very late is the same as not showing up. You will be a \"no show\" if:  Your appointment starts with a BHC, and you're more than 15 minutes late for a 30-minute (half hour) visit. This will also cancel your appointment with the psychiatric doctor.  Your appointment is with a psychiatric doctor only, and you're more than 15 minutes late for a 30-minute (half hour) visit.  Your appointment is with a psychiatric doctor only, and you're more than 30 minutes late for a 60-minute (full hour) visit.  If you cancel late or don't show up 2 times within 6 months, " we may end your care.   Getting help between visits  If you need help between visits, you can call us Monday to Friday from 8 a.m. to 4:30 p.m. at [phone].  Emergency care  Call 911 or go to the nearest emergency department if your life or someone else's life is in danger.  Call 988 anytime to reach the national Suicide and Crisis hotline.  Medicine refills  To refill your medicine, call your pharmacy. You can also call [facility]'s Behavioral Access at [phone], Monday to Friday, 8 a.m. to 4:30 p.m. It can take 1 to 3 business days to get a refill.   Forms, letters, and tests  You may have papers to fill out, like FMLA, short-term disability, and workability. We can help you with these forms at your visits, but you must have an appointment. You may need more than 1 visit for this, to be in an intensive therapy program, or both.  Before we can give you medicine for ADHD, we may refer you to get tested for it or confirm it another way.  We may not be able to give you an emotional support animal letter.  We don't do mental health checks ordered by the court.   We don't do mental health testing, but we can refer you to get tested.   Thank you for choosing us for your care.  For informational purposes only. Not to replace the advice of your health care provider. Copyright   2022 [facility]. All rights reserved. [place] [id number] - 12/22.       Moving from: Collaborative Care Psychiatry Service (CCPS)    Moving to: Referring Provider        We are returning your care back to your Referring Provider.      We will update your Referring Provider/Clinic that you've completed your care with CCPS. This way, they can help you build on the progress you've made in your mental health.        Here's what happens next:    Within the next 3 months: Please set up a visit with your referring provider. Ask for a mental health check-in.  Stay on your current medications--do not change your doses. Your  symptoms could get worse if you quickly stop or decrease your medicine.  We've refilled your mental health medications for the next 3 months (90 days). Future refills or dose changes should come from your Referring Provider Clinic.    If you're in therapy, keep it up! If you're not but would like to start, ask your Referring Provider clinic for a referral to therapy, or call [facility] ([phone]) to set up a visit with a therapist.        It's been a pleasure to work with you! If you and your clinic decide that [facility] should [facility] in the future, we remain ready to serve you. Ask your clinic for a new referral if needed.

## 2024-01-10 NOTE — Clinical Note
Angy,  Thank you for your consult and care of the patient.  Patient has OCD and depression.  Thankfully does not have bipolar disorder.  Started Luvox SSRI which has better efficacy for OCD but also effective for general anxiety and depression.  Sincerely, Eddie Purdy M.D. Consultative Psychiatrist Program Medical Director, [facility] Collaborative Care Psychiatry Service
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
## 2024-01-10 NOTE — PROGRESS NOTES
Mental Health and Collaborative Care Psychiatry Service Rooming Note      Most pressing mental health concern at this time: depression, anxiety, no rex      Any new physical health conditions or diagnoses affecting you that we should be aware of: no      Side effects related to medications patient would like to discuss with the provider:  Yes      Are you taking your medications as prescribed?  yes  If not, why? N/A      Do you need refills of any of the medications?  no  If so, which ones? N/A      Are you taking any recreational substances? NO      Is there any chance you are pregnant? maybe  Do you use birth control? no      Provider notified  N/A         Care team has reviewed attendance agreement with patient. Patient advised that two failed appointments within 6 months may lead to termination of current episode of care.             Buffy Osei CMA  January 10, 2024  9:23 AM

## 2024-04-12 ENCOUNTER — E-VISIT (OUTPATIENT)
Dept: FAMILY MEDICINE | Facility: CLINIC | Age: 34
End: 2024-04-12
Payer: COMMERCIAL

## 2024-04-12 DIAGNOSIS — R05.1 ACUTE COUGH: Primary | ICD-10-CM

## 2024-04-12 PROCEDURE — 99207 PR NON-BILLABLE SERV PER CHARTING: CPT

## 2024-04-12 NOTE — PATIENT INSTRUCTIONS
Thank you for choosing us for your care. I think an in-clinic visit would be best next steps based on your symptoms. Please schedule a clinic appointment; you won t be charged for this eVisit.      You can schedule an appointment right here in [facility], or call [phone]

## 2024-07-29 ENCOUNTER — MYC REFILL (OUTPATIENT)
Dept: FAMILY MEDICINE | Facility: CLINIC | Age: 34
End: 2024-07-29
Payer: COMMERCIAL

## 2024-07-29 DIAGNOSIS — F33.1 MODERATE EPISODE OF RECURRENT MAJOR DEPRESSIVE DISORDER (H): ICD-10-CM

## 2024-07-29 RX ORDER — BUPROPION HYDROCHLORIDE 150 MG/1
150 TABLET, EXTENDED RELEASE ORAL 2 TIMES DAILY
Qty: 60 TABLET | Refills: 4 | Status: SHIPPED | OUTPATIENT
Start: 2024-07-29

## 2024-07-29 NOTE — TELEPHONE ENCOUNTER
Reva called to discuss the possible gap in medication.  Reva stated that she has continuously taken this medication as prescribed, but ran out yesterday.  Confirmed that she takes twice a day and Jonathan stated that she was taking it twice a day.    Reva requesting that medication be sent in 30 days as she is unable to pay for the full 90 day refill.

## 2024-12-15 ENCOUNTER — HEALTH MAINTENANCE LETTER (OUTPATIENT)
Age: 34
End: 2024-12-15

## 2025-02-07 ENCOUNTER — HOSPITAL ENCOUNTER (EMERGENCY)
Facility: CLINIC | Age: 35
Discharge: HOME | End: 2025-02-07
Attending: EMERGENCY MEDICINE | Admitting: EMERGENCY MEDICINE

## 2025-02-07 VITALS
SYSTOLIC BLOOD PRESSURE: 147 MMHG | HEART RATE: 113 BPM | BODY MASS INDEX: 31.37 KG/M2 | WEIGHT: 188.3 LBS | OXYGEN SATURATION: 97 % | RESPIRATION RATE: 21 BRPM | HEIGHT: 65 IN | TEMPERATURE: 98.7 F | DIASTOLIC BLOOD PRESSURE: 85 MMHG

## 2025-02-07 DIAGNOSIS — M54.2 MUSCULOSKELETAL NECK PAIN: ICD-10-CM

## 2025-02-07 DIAGNOSIS — F41.9 ANXIETY: ICD-10-CM

## 2025-02-07 DIAGNOSIS — G44.209 TENSION HEADACHE: ICD-10-CM

## 2025-02-07 PROCEDURE — 99283 EMERGENCY DEPT VISIT LOW MDM: CPT

## 2025-02-07 RX ORDER — BACLOFEN 10 MG/1
10 TABLET ORAL 3 TIMES DAILY PRN
Qty: 15 TABLET | Refills: 0 | Status: SHIPPED | OUTPATIENT
Start: 2025-02-07

## 2025-02-07 ASSESSMENT — COLUMBIA-SUICIDE SEVERITY RATING SCALE - C-SSRS
2. HAVE YOU ACTUALLY HAD ANY THOUGHTS OF KILLING YOURSELF IN THE PAST MONTH?: NO
1. IN THE PAST MONTH, HAVE YOU WISHED YOU WERE DEAD OR WISHED YOU COULD GO TO SLEEP AND NOT WAKE UP?: NO
6. HAVE YOU EVER DONE ANYTHING, STARTED TO DO ANYTHING, OR PREPARED TO DO ANYTHING TO END YOUR LIFE?: NO

## 2025-02-08 NOTE — ED PROVIDER NOTES
EMERGENCY DEPARTMENT ENCOUNTER      NAME: Reva Spangler  AGE: 34 year old female  YOB: 1990  MRN: [id number]  EVALUATION DATE & TIME: No admission date for patient encounter.    PCP: No Ref-Primary, Physician    ED PROVIDER: Lashay Bee M.D.      Chief Complaint   Patient presents with    Headache       FINAL IMPRESSION:  1. Anxiety    2. Musculoskeletal neck pain    3. Tension headache        ED COURSE & MEDICAL DECISION MAKIN:20 PM I met with the patient to gather history and to perform my initial exam. I discussed the plan for care while in the Emergency Department.  ED Course as of 25 1.  Tension headache, neck tension.  Differentials considered include cluster headache, SAH, meningitis, IIH, temporal arteritis, migraine headache, tension headache, generalized headache without clear cause.  No symptoms of intracranial mass, intraspinal pathology.  Musculoskeletal pain and anxiety and OCD likely contributing to symptoms.  No significant symptoms in ER, recommended ibuprofen, Tylenol, baclofen as needed.  Continued follow-up for OCD meds as well as anxiety with primary care versus psychiatry recommended.  Discharged home.     Pertinent Labs & Imaging studies reviewed. (See chart for details).    Medical Decision Making  Obtained supplemental history:Supplemental history obtained?: No  Reviewed external records: External records reviewed?: Documented in chart  Care impacted by chronic illness:Other: Generalized anxiety disorder  Did you consider but not order tests?: Work up considered but not performed and documented in chart, if applicable  Did you interpret images independently?: Independent interpretation of ECG and images noted in documentation, when applicable.  Consultation discussion with other provider:Did you involve another provider (consultant, , pharmacy, etc.)?: No  Discharge. I prescribed additional prescription strength medication(s)  "as charted. N/A.    MIPS: Not Applicable      At the conclusion of the encounter I discussed the results of all of the tests and the disposition. The questions were answered. The patient or family acknowledged understanding and was agreeable with the care plan.      CRITICAL CARE:  N/A    HPI    Patient information was obtained from: patient     Use of : N/A        Reva Spangler is a 34 year old female who presents for headache.     The patient reports that for the past month she has been feeling a lot of \"tension\" and \"pressure\" in her head. She said she has a headache, is tired and has constant neck and shoulder pain. She reports that she has convinced herself she has a \"brain tumor\" after googling her symptoms. She is sick to her stomach and \"can't take it anymore.\" She wants to make sure she is healthy. She reports no headache currently, rather feels some tension in her head. She takes tylenol here and there but no ibuprofen. Patient reported that when she doesn't think about her symptoms she is okay. However, when she laughs or coughs too hard, she has pain in her head. She noted that her headache is bad when she spiral emotionally.     Of note, patient reports a history of generalized anxiety disorder and she just started taking wellbutrin again a few weeks ago. She states she also started Fluvoxamine but she felt \"awful\" taking both so she stopped that, she has a month's worth of Fluvoxamine left at home. She noted to have had a bad anxiety attack after christiano, and that she has been \"spiraling\" since. Patient is currently breastfeeding.     Patient denies vision changes or unintentional weight loss. No fevers, thoughts of hurting herself or others. No hallucinations or hearing things. No history of back surgery.       REVIEW OF SYSTEMS  All other systems negative unless noted in HPI.    PAST MEDICAL HISTORY:  History reviewed. No pertinent past medical history.    PAST SURGICAL " HISTORY:  History reviewed. No pertinent surgical history.      CURRENT MEDICATIONS:    No current facility-administered medications for this encounter.     Current Outpatient Medications   Medication Sig Dispense Refill    baclofen (LIORESAL) 10 MG tablet Take 1 tablet (10 mg) by mouth 3 times daily as needed (neck, back pain, tension in head). 15 tablet 0    buPROPion (WELLBUTRIN SR) 150 MG 12 hr tablet Take 1 tablet (150 mg) by mouth 2 times daily 60 tablet 4    cannabidiol (EPIDIOLEX) 100 MG/ML oral solution Take 250 mg by mouth 2 times daily CBD GUMMIES for last 2 weeks due to unable to get Buspirone      fluvoxaMINE (LUVOX) 50 MG tablet Take 1 tablet (50 mg) by mouth at bedtime 90 tablet 0    Vitamin D3 (CHOLECALCIFEROL) 25 mcg (1000 units) tablet Take 1 tablet (25 mcg) by mouth daily 90 tablet 1         ALLERGIES:  No Known Allergies    FAMILY HISTORY:  Family History   Problem Relation Age of Onset    Bipolar Disorder Mother     Depression Father     Breast Cancer No family hx of     Colon Cancer No family hx of     Diabetes No family hx of     Thyroid Disease No family hx of     Coronary Artery Disease Early Onset No family hx of        SOCIAL HISTORY:  Social History     Socioeconomic History    Marital status: Single   Tobacco Use    Smoking status: Former     Types: Cigarettes     Passive exposure: Past    Smokeless tobacco: Never   Vaping Use    Vaping status: Never Used     Social Drivers of Health     Financial Resource Strain: Low Risk  (12/29/2023)    Financial Resource Strain     Within the past 12 months, have you or your family members you live with been unable to get utilities (heat, electricity) when it was really needed?: No   Food Insecurity: No Food Insecurity (9/5/2024)    [facility] from [facility]    Hunger Vital Sign     Worried About Running Out of Food in the Last Year: Never true     Ran Out of Food in the Last Year: Never true   Transportation Needs: No Transportation Needs  "(9/5/2024)    [facility]    PRAPARE - Transportation     In the past 12 months, has lack of transportation kept you from medical appointments or from getting medications?: No     In the past 12 months, has lack of transportation kept you from meetings, work, or from getting things needed for daily living?: No   Interpersonal Safety: Unknown (9/5/2024)    Received from [employer]    Humiliation, Afraid, Rape, and Kick questionnaire     Emotionally Abused: No     Physically Abused: No     Sexually Abused: No   Housing Stability: Low Risk  (9/5/2024)    [facility]    Housing Stability Vital Sign     Unable to Pay for Housing in the Last Year: No     Number of Times Moved in the Last Year: 1     Homeless in the Last Year: No       VITALS:  Patient Vitals for the past 24 hrs:   BP Temp Temp src Pulse Resp SpO2 Height Weight   02/07/25 2030 (!) 147/85 98.7  F (37.1  C) Oral 113 21 97 % 1.651 m (5' 5\") 85.4 kg (188 lb 4.8 oz)       PHYSICAL EXAM    VITAL SIGNS: BP (!) 147/85   Pulse 113   Temp 98.7  F (37.1  C) (Oral)   Resp 21   Ht 1.651 m (5' 5\")   Wt 85.4 kg (188 lb 4.8 oz)   SpO2 97%   BMI 31.33 kg/m    Physical Exam  Vitals and nursing note reviewed.   Constitutional:       General: She is not in acute distress.     Appearance: She is not toxic-appearing.   Eyes:      General: No scleral icterus.        Right eye: No discharge.         Left eye: No discharge.      Extraocular Movements: Extraocular movements intact.      Pupils: Pupils are equal, round, and reactive to light.   Cardiovascular:      Rate and Rhythm: Normal rate and regular rhythm.   Pulmonary:      Effort: Pulmonary effort is normal. No respiratory distress.      Breath sounds: Normal breath sounds.   Abdominal:      General: There is no distension.      Palpations: Abdomen is soft.      Tenderness: There is no abdominal tenderness.   Musculoskeletal:         General: No swelling or deformity.      " Cervical back: Neck supple. No rigidity.   Skin:     General: Skin is warm and dry.      Capillary Refill: Capillary refill takes less than 2 seconds.      Findings: No bruising or erythema.   Neurological:      General: No focal deficit present.      Mental Status: She is alert and oriented to person, place, and time. Mental status is at baseline.      Comments: Alert and oriented x3, finger-to-nose intact bilaterally, clear speech without dysarthria or aphasia, no facial droop, 5 out of 5 strength to upper and lower extremities bilaterally.  Sensation grossly intact to upper and lower extremities bilaterally.    Psychiatric:         Mood and Affect: Mood is anxious.         Behavior: Behavior normal.         LABS  Labs Ordered and Resulted from Time of ED Arrival to Time of ED Departure - No data to display      RADIOLOGY  No orders to display      NA      EKG:    NA       PROCEDURES:  N/A      MEDICATIONS GIVEN IN THE EMERGENCY:  Medications - No data to display    NEW PRESCRIPTIONS STARTED AT TODAY'S ER VISIT  Discharge Medication List as of 2/7/2025  9:50 PM        START taking these medications    Details   baclofen (LIORESAL) 10 MG tablet Take 1 tablet (10 mg) by mouth 3 times daily as needed (neck, back pain, tension in head)., Disp-15 tablet, R-0, E-Prescribe              I, Jeaneth Reyna, am serving as a scribe to document services personally performed by Lashay Bee MD, based on my observations and the provider's statements to me.  I, Lashay Bee MD, attest that Jeaneth Reyna is acting in a scribe capacity, has observed my performance of the services and has documented them in accordance with my direction.     Lashay Bee MD  Emergency Medicine  [facility] EMERGENCY ROOM  [address] [facility] [id number] [phone]  Dept: [phone]       Lashay Bee MD  02/07/25 [id number]

## 2025-02-08 NOTE — DISCHARGE INSTRUCTIONS
You do not have symptoms of a brain tumor.  Try foam rolling or using a tennis ball against a wall to loosen your muscles in your neck and back.  Take 600 mg ibuprofen every 8 hours and or 650 mg Tylenol every 4-6 hours for pain as needed.  You can also try muscle relaxants as needed.

## 2025-02-08 NOTE — ED TRIAGE NOTES
Pt arrives to ed with c/o of headaches that has been going on for sometimes, feels fine one day then not the next, then gets headaches for a few days and has been feeling sick to stomach. Denies any vision, changes, gait imbalances, numbness, or slurring.      Triage Assessment (Adult)       Row Name 02/07/25 2031          Triage Assessment    Airway WDL WDL        Respiratory WDL    Respiratory WDL WDL        Cardiac WDL    Cardiac WDL WDL        Cognitive/Neuro/Behavioral WDL    Cognitive/Neuro/Behavioral WDL WDL

## 2025-02-18 ENCOUNTER — HOSPITAL ENCOUNTER (EMERGENCY)
Facility: CLINIC | Age: 35
Discharge: HOME | End: 2025-02-18
Attending: EMERGENCY MEDICINE | Admitting: EMERGENCY MEDICINE

## 2025-02-18 ENCOUNTER — APPOINTMENT (OUTPATIENT)
Dept: CT IMAGING | Facility: CLINIC | Age: 35
End: 2025-02-18
Attending: EMERGENCY MEDICINE

## 2025-02-18 VITALS
TEMPERATURE: 98.3 F | WEIGHT: 186 LBS | RESPIRATION RATE: 22 BRPM | DIASTOLIC BLOOD PRESSURE: 72 MMHG | BODY MASS INDEX: 30.95 KG/M2 | OXYGEN SATURATION: 100 % | SYSTOLIC BLOOD PRESSURE: 136 MMHG | HEART RATE: 81 BPM

## 2025-02-18 DIAGNOSIS — F41.1 ANXIETY REACTION: ICD-10-CM

## 2025-02-18 DIAGNOSIS — G44.209 TENSION HEADACHE: ICD-10-CM

## 2025-02-18 DIAGNOSIS — F41.1 GAD (GENERALIZED ANXIETY DISORDER): ICD-10-CM

## 2025-02-18 LAB
ALBUMIN SERPL BCG-MCNC: 4.6 G/DL (ref 3.5–5.2)
ALP SERPL-CCNC: 104 U/L (ref 40–150)
ALT SERPL W P-5'-P-CCNC: 33 U/L (ref 0–50)
ANION GAP SERPL CALCULATED.3IONS-SCNC: 12 MMOL/L (ref 7–15)
AST SERPL W P-5'-P-CCNC: 25 U/L (ref 0–45)
ATRIAL RATE - MUSE: 80 BPM
BASOPHILS # BLD AUTO: 0.1 10E3/UL (ref 0–0.2)
BASOPHILS NFR BLD AUTO: 1 %
BILIRUB DIRECT SERPL-MCNC: 0.2 MG/DL (ref 0–0.3)
BILIRUB SERPL-MCNC: 0.4 MG/DL
BUN SERPL-MCNC: 9.1 MG/DL (ref 6–20)
CALCIUM SERPL-MCNC: 9.8 MG/DL (ref 8.8–10.4)
CHLORIDE SERPL-SCNC: 104 MMOL/L (ref 98–107)
CREAT SERPL-MCNC: 1.05 MG/DL (ref 0.51–0.95)
DIASTOLIC BLOOD PRESSURE - MUSE: NORMAL MMHG
EGFRCR SERPLBLD CKD-EPI 2021: 72 ML/MIN/1.73M2
EOSINOPHIL # BLD AUTO: 0.1 10E3/UL (ref 0–0.7)
EOSINOPHIL NFR BLD AUTO: 2 %
ERYTHROCYTE [DISTWIDTH] IN BLOOD BY AUTOMATED COUNT: 13.8 % (ref 10–15)
GLUCOSE SERPL-MCNC: 97 MG/DL (ref 70–99)
HCG UR QL: NEGATIVE
HCO3 SERPL-SCNC: 26 MMOL/L (ref 22–29)
HCT VFR BLD AUTO: 41.5 % (ref 35–47)
HGB BLD-MCNC: 13.9 G/DL (ref 11.7–15.7)
HOLD SPECIMEN: NORMAL
IMM GRANULOCYTES # BLD: 0 10E3/UL
IMM GRANULOCYTES NFR BLD: 0 %
INTERPRETATION ECG - MUSE: NORMAL
LYMPHOCYTES # BLD AUTO: 2 10E3/UL (ref 0.8–5.3)
LYMPHOCYTES NFR BLD AUTO: 32 %
MAGNESIUM SERPL-MCNC: 2 MG/DL (ref 1.7–2.3)
MCH RBC QN AUTO: 29.8 PG (ref 26.5–33)
MCHC RBC AUTO-ENTMCNC: 33.5 G/DL (ref 31.5–36.5)
MCV RBC AUTO: 89 FL (ref 78–100)
MONOCYTES # BLD AUTO: 0.5 10E3/UL (ref 0–1.3)
MONOCYTES NFR BLD AUTO: 8 %
NEUTROPHILS # BLD AUTO: 3.6 10E3/UL (ref 1.6–8.3)
NEUTROPHILS NFR BLD AUTO: 57 %
NRBC # BLD AUTO: 0 10E3/UL
NRBC BLD AUTO-RTO: 0 /100
P AXIS - MUSE: 50 DEGREES
PLATELET # BLD AUTO: 266 10E3/UL (ref 150–450)
POTASSIUM SERPL-SCNC: 3.6 MMOL/L (ref 3.4–5.3)
PR INTERVAL - MUSE: 128 MS
PROT SERPL-MCNC: 7.5 G/DL (ref 6.4–8.3)
QRS DURATION - MUSE: 78 MS
QT - MUSE: 374 MS
QTC - MUSE: 431 MS
R AXIS - MUSE: 7 DEGREES
RBC # BLD AUTO: 4.67 10E6/UL (ref 3.8–5.2)
SODIUM SERPL-SCNC: 142 MMOL/L (ref 135–145)
SYSTOLIC BLOOD PRESSURE - MUSE: NORMAL MMHG
T AXIS - MUSE: -21 DEGREES
VENTRICULAR RATE- MUSE: 80 BPM
WBC # BLD AUTO: 6.3 10E3/UL (ref 4–11)

## 2025-02-18 PROCEDURE — 81025 URINE PREGNANCY TEST: CPT | Performed by: EMERGENCY MEDICINE

## 2025-02-18 PROCEDURE — 82374 ASSAY BLOOD CARBON DIOXIDE: CPT | Performed by: EMERGENCY MEDICINE

## 2025-02-18 PROCEDURE — 96360 HYDRATION IV INFUSION INIT: CPT

## 2025-02-18 PROCEDURE — 250N000013 HC RX MED GY IP 250 OP 250 PS 637: Performed by: EMERGENCY MEDICINE

## 2025-02-18 PROCEDURE — 83735 ASSAY OF MAGNESIUM: CPT | Performed by: EMERGENCY MEDICINE

## 2025-02-18 PROCEDURE — 70450 CT HEAD/BRAIN W/O DYE: CPT

## 2025-02-18 PROCEDURE — 80053 COMPREHEN METABOLIC PANEL: CPT | Performed by: EMERGENCY MEDICINE

## 2025-02-18 PROCEDURE — 93005 ELECTROCARDIOGRAM TRACING: CPT | Performed by: EMERGENCY MEDICINE

## 2025-02-18 PROCEDURE — 36415 COLL VENOUS BLD VENIPUNCTURE: CPT | Performed by: EMERGENCY MEDICINE

## 2025-02-18 PROCEDURE — 99285 EMERGENCY DEPT VISIT HI MDM: CPT | Mod: 25

## 2025-02-18 PROCEDURE — 258N000003 HC RX IP 258 OP 636: Performed by: EMERGENCY MEDICINE

## 2025-02-18 PROCEDURE — 85025 COMPLETE CBC W/AUTO DIFF WBC: CPT | Performed by: EMERGENCY MEDICINE

## 2025-02-18 RX ORDER — HYDROXYZINE HYDROCHLORIDE 25 MG/1
25 TABLET, FILM COATED ORAL ONCE
Status: COMPLETED | OUTPATIENT
Start: 2025-02-18 | End: 2025-02-18

## 2025-02-18 RX ORDER — HYDROXYZINE HYDROCHLORIDE 25 MG/1
25-50 TABLET, FILM COATED ORAL 3 TIMES DAILY PRN
Qty: 30 TABLET | Refills: 0 | Status: SHIPPED | OUTPATIENT
Start: 2025-02-18

## 2025-02-18 RX ADMIN — HYDROXYZINE HYDROCHLORIDE 25 MG: 25 TABLET, FILM COATED ORAL at 19:45

## 2025-02-18 RX ADMIN — SODIUM CHLORIDE 1000 ML: 9 INJECTION, SOLUTION INTRAVENOUS at 19:13

## 2025-02-18 ASSESSMENT — COLUMBIA-SUICIDE SEVERITY RATING SCALE - C-SSRS
1. IN THE PAST MONTH, HAVE YOU WISHED YOU WERE DEAD OR WISHED YOU COULD GO TO SLEEP AND NOT WAKE UP?: NO
2. HAVE YOU ACTUALLY HAD ANY THOUGHTS OF KILLING YOURSELF IN THE PAST MONTH?: NO
6. HAVE YOU EVER DONE ANYTHING, STARTED TO DO ANYTHING, OR PREPARED TO DO ANYTHING TO END YOUR LIFE?: NO

## 2025-02-18 NOTE — ED TRIAGE NOTES
Patient presents to the ED with generalized fatigue, malaise, and increased anxiety. Patient has a five month old and does struggle with anxiety. Her  works two jobs and she is alone at home with the baby. She exclusively breast feeds and states she has not gotten much sleep. Patient is concerned because her feet/arms intermittently feel numb. Patient has been having tinnitus to both her ears and has been having diarrhea the last week and a half as well as a decrease in her appetite. Patient was seen her two weeks ago for symptoms of shoulder/neck pain and was discharged with muscle relaxers. Patient has not gotten her period back since she gave birth but is unsure if she is pregnant or not.

## 2025-02-19 NOTE — DISCHARGE INSTRUCTIONS
Follow up with primary care referral - they will call you.  Continue your daily medications as directed/needed.  Ok to continue tylenol and also take ibuprofen 600mg up to 3 times a day with food for pain. Take hydroxyzine for breakthrough anxiety symptoms.

## 2025-02-19 NOTE — ED PROVIDER NOTES
Emergency Department Encounter     Evaluation Date & Time:   No admission date for patient encounter.    CHIEF COMPLAINT:  Fatigue, Anxiety, and Nausea      Triage Note:Patient presents to the ED with generalized fatigue, malaise, and increased anxiety. Patient has a five month old and does struggle with anxiety. Her  works two jobs and she is alone at home with the baby. She exclusively breast feeds and states she has not gotten much sleep. Patient is concerned because her feet/arms intermittently feel numb. Patient has been having tinnitus to both her ears and has been having diarrhea the last week and a half as well as a decrease in her appetite. Patient was seen her two weeks ago for symptoms of shoulder/neck pain and was discharged with muscle relaxers. Patient has not gotten her period back since she gave birth but is unsure if she is pregnant or not.               ED COURSE & MEDICAL DECISION MAKING:     Pt here with a variety of intermittent symptoms over past few months.  Pt initially associated them with her fluvoxamine for OCD, so she stopped at the end of December, had withdrawal side effects, but has been back on it for some time. Again having what sounds like tension HAs and anxiety.  Pt denies chest pain, dyspnea. She has ongoing intermittent tinnitus in both ears that's intermittent. Also having intermittent left foot numbness/tingling. She did give birth 5 months ago.  Nothing to suggest DVT or PE.  Suspect paresthesias likely from radiculopathy/sciatica with recent pregnancy/birth.  Will get labs, CT brain.  Pt complaint now with fluvoxamine and wellbutrin. Admits to a lot of anxiety ongoing.  If workup all negative, would consider Rx for hydroxyzine PRN.  Pt not insured, so will refer to primary/system to help with follow up and/or insurance.  Pt agreeable.    ED Course as of 02/18/25 2031 Tue Feb 18, 2025 1929 I met the patient and performed my initial interview and exam.   1947 Labs  all reassuring, hcg neg.   2016 CT brain neg.  Will update pt.  Rx for hydroxyzine prn, outpatient follow up/referral.   2028 Pt updated on all results, outpatient follow up referral.  Pt feels much better after getting results, HR down to 80s.         Medical Decision Making    History:  Supplemental history from: Documented in chart  External Record(s) reviewed: Documented in chart    Work Up:  Chart documentation includes differential considered and any EKGs or imaging independently interpreted by provider, where specified.  In additional to work up documented, I considered the following work up: Documented in chart, if applicable.    External consultation:  Discussion of management with another provider: Documented in chart, if applicable    Complicating factors:  Care impacted by chronic illness: Mental Health  Care affected by social determinants of health: N/A    Disposition considerations: Discharge. I prescribed additional prescription strength medication(s) as charted. See documentation for any additional details.    Not Applicable     At the conclusion of the encounter I discussed the results of all the tests and the disposition. The questions were answered. The patient or family acknowledged understanding and was agreeable with the care plan.      MEDICATIONS GIVEN IN THE EMERGENCY DEPARTMENT:  Medications   sodium chloride 0.9% BOLUS 1,000 mL (0 mLs Intravenous Stopped 2/18/25 1955)   hydrOXYzine HCl (ATARAX) tablet 25 mg (25 mg Oral $Given 2/18/25 1945)       NEW PRESCRIPTIONS STARTED AT TODAY'S ED VISIT:  New Prescriptions    HYDROXYZINE HCL (ATARAX) 25 MG TABLET    Take 1-2 tablets (25-50 mg) by mouth 3 times daily as needed for anxiety.       HPI   HPI     Reva Spangler is a 34 year old female with a pertinent history of anxiety and depression who presents to this ED via walk-in for evaluation of fatigue, and anxiety.    In Dec-2024, the patient had a massive panic attack that made her stop  "taking her fluvoxamine cold turkey and then was on/off with her Wellbutrin. Since that incident, she has noted increased malaise, as well as a headache throughout Dec-2024 and Jan-2025; however, these symptoms began to wane as her mental health improved. But in the last 2-3 weeks she has been consistently waking up feeling unwell again. In response to this she restarted both medications, but still endorses intermittent left foot numbness, tremors and nausea upon waking, and tinnitus persisting through most of the day. In particular, she notes neck pain that radiates up into her head with a pressure feeling, and her muscles seem to all be tighter. She has been using Tylenol, but no consistent aspirin or ibuprofen use. Today, she presents for these ongoing symptoms, and particularly states that she is \"terrified to know what's going on with my brain\" but feels that a negative workup will help her mental health.    She has a history of generalized anxiety disorder and worries that this is contributing to her physical symptoms. She notes sciatica and radiculopathy in her recent pregnancy (labor and delivery in Sep-2024).     She has not had health insurance since Oct-2024 and has been unable to get any despite efforts since then.     Per Chart Review:  Visit to [facility] ER on 07-Feb-2025 for headache. No significant symptoms in the ER, so recommended ibuprofen and Tylenol as needed, and was prescribed Baclofen. She was also advised to follow-up with her primary care provider or psychiatry for her OCD medications.     REVIEW OF SYSTEMS:  See HPI      Medical History   History reviewed. No pertinent past medical history.    History reviewed. No pertinent surgical history.    Family History   Problem Relation Age of Onset    Bipolar Disorder Mother     Depression Father     Breast Cancer No family hx of     Colon Cancer No family hx of     Diabetes No family hx of     Thyroid Disease No family hx of     Coronary Artery " Disease Early Onset No family hx of        Social History     Tobacco Use    Smoking status: Former     Types: Cigarettes     Passive exposure: Past    Smokeless tobacco: Never   Vaping Use    Vaping status: Never Used       hydrOXYzine HCl (ATARAX) 25 MG tablet  baclofen (LIORESAL) 10 MG tablet  buPROPion (WELLBUTRIN SR) 150 MG 12 hr tablet  cannabidiol (EPIDIOLEX) 100 MG/ML oral solution  fluvoxaMINE (LUVOX) 50 MG tablet  Vitamin D3 (CHOLECALCIFEROL) 25 mcg (1000 units) tablet        Physical Exam     Vitals:  /72   Pulse 81   Temp 98.3  F (36.8  C) (Oral)   Resp 22   Wt 84.4 kg (186 lb)   LMP  (Approximate)   SpO2 100%   Breastfeeding Yes   BMI 30.95 kg/m      PHYSICAL EXAM:   Physical Exam  Vitals and nursing note reviewed.   Constitutional:       General: She is not in acute distress.     Appearance: Normal appearance.      Comments: Somewhat tremulous and anxious.    HENT:      Head: Normocephalic and atraumatic.      Mouth/Throat:      Mouth: Mucous membranes are moist.   Eyes:      Extraocular Movements: Extraocular movements intact.      Pupils: Pupils are equal, round, and reactive to light.      Comments: No vertical or bidirectional nystagmus   Cardiovascular:      Rate and Rhythm: Normal rate and regular rhythm.   Pulmonary:      Effort: Pulmonary effort is normal. No respiratory distress.      Breath sounds: Normal breath sounds.   Abdominal:      General: There is no distension.      Palpations: Abdomen is soft.      Tenderness: There is no abdominal tenderness.   Musculoskeletal:         General: Normal range of motion.      Cervical back: Normal range of motion.   Skin:     General: Skin is warm.      Capillary Refill: Capillary refill takes less than 2 seconds.   Neurological:      Mental Status: She is alert and oriented to person, place, and time.      Sensory: Sensation is intact.      Motor: Motor function is intact.      Comments: Fluent speech, no facial asymmetry or pronator  drift, 5/5 strength b/l UE/LE, normal finger to nose b/l         Results     LAB:  All pertinent labs reviewed and interpreted  Labs Ordered and Resulted from Time of ED Arrival to Time of ED Departure   BASIC METABOLIC PANEL - Abnormal       Result Value    Sodium 142      Potassium 3.6      Chloride 104      Carbon Dioxide (CO2) 26      Anion Gap 12      Urea Nitrogen 9.1      Creatinine 1.05 (*)     GFR Estimate 72      Calcium 9.8      Glucose 97     HCG QUALITATIVE URINE - Normal    hCG Urine Qualitative Negative     HEPATIC FUNCTION PANEL - Normal    Protein Total 7.5      Albumin 4.6      Bilirubin Total 0.4      Alkaline Phosphatase 104      AST 25      ALT 33      Bilirubin Direct 0.20     MAGNESIUM - Normal    Magnesium 2.0     CBC WITH PLATELETS AND DIFFERENTIAL    WBC Count 6.3      RBC Count 4.67      Hemoglobin 13.9      Hematocrit 41.5      MCV 89      MCH 29.8      MCHC 33.5      RDW 13.8      Platelet Count 266      % Neutrophils 57      % Lymphocytes 32      % Monocytes 8      % Eosinophils 2      % Basophils 1      % Immature Granulocytes 0      NRBCs per 100 WBC 0      Absolute Neutrophils 3.6      Absolute Lymphocytes 2.0      Absolute Monocytes 0.5      Absolute Eosinophils 0.1      Absolute Basophils 0.1      Absolute Immature Granulocytes 0.0      Absolute NRBCs 0.0         RADIOLOGY:  Head CT w/o contrast   Final Result   IMPRESSION:   1.  Normal head CT.                   ECG:  NSR, rate 80, no acute ischemia, normal intervals    I have independently reviewed and interpreted the EKG(s) documented above     PROCEDURES:  Procedures:  none      FINAL IMPRESSION:    ICD-10-CM    1. Anxiety reaction  F41.1 Primary Care Referral      2. Tension headache  G44.209 Primary Care Referral      3. PÉREZ (generalized anxiety disorder)  F41.1 Primary Care Referral          0 minutes of critical care time      I, Hubert Coe, am serving as a scribe to document services personally performed by Dr. Clarke  Alonso, based on my observations and the provider's statements to me. I, Vince Gupta, DO attest that Hubert Coe is acting in a scribe capacity, has observed my performance of the services and has documented them in accordance with my direction.      Vince Gupta DO  Emergency Medicine  [facility] EMERGENCY ROOM  2/18/2025  7:44 PM          Vince Gupta MD  02/18/25 2031